# Patient Record
Sex: FEMALE | Race: WHITE | URBAN - METROPOLITAN AREA
[De-identification: names, ages, dates, MRNs, and addresses within clinical notes are randomized per-mention and may not be internally consistent; named-entity substitution may affect disease eponyms.]

---

## 2019-06-03 ENCOUNTER — APPOINTMENT (RX ONLY)
Dept: URBAN - METROPOLITAN AREA CLINIC 34 | Facility: CLINIC | Age: 69
Setting detail: DERMATOLOGY
End: 2019-06-03

## 2019-06-03 VITALS
HEIGHT: 62 IN | DIASTOLIC BLOOD PRESSURE: 70 MMHG | WEIGHT: 125 LBS | SYSTOLIC BLOOD PRESSURE: 123 MMHG | HEART RATE: 90 BPM

## 2019-06-03 DIAGNOSIS — L72.0 EPIDERMAL CYST: ICD-10-CM

## 2019-06-03 DIAGNOSIS — L82.1 OTHER SEBORRHEIC KERATOSIS: ICD-10-CM

## 2019-06-03 DIAGNOSIS — L57.0 ACTINIC KERATOSIS: ICD-10-CM

## 2019-06-03 DIAGNOSIS — R03.0 ELEVATED BLOOD-PRESSURE READING, WITHOUT DIAGNOSIS OF HYPERTENSION: ICD-10-CM

## 2019-06-03 DIAGNOSIS — L57.8 OTHER SKIN CHANGES DUE TO CHRONIC EXPOSURE TO NONIONIZING RADIATION: ICD-10-CM

## 2019-06-03 PROBLEM — E03.9 HYPOTHYROIDISM, UNSPECIFIED: Status: ACTIVE | Noted: 2019-06-03

## 2019-06-03 PROBLEM — M12.9 ARTHROPATHY, UNSPECIFIED: Status: ACTIVE | Noted: 2019-06-03

## 2019-06-03 PROBLEM — L85.3 XEROSIS CUTIS: Status: ACTIVE | Noted: 2019-06-03

## 2019-06-03 PROBLEM — Z85.3 PERSONAL HISTORY OF MALIGNANT NEOPLASM OF BREAST: Status: ACTIVE | Noted: 2019-06-03

## 2019-06-03 PROBLEM — Z92.3 PERSONAL HISTORY OF IRRADIATION: Status: ACTIVE | Noted: 2019-06-03

## 2019-06-03 PROCEDURE — 99202 OFFICE O/P NEW SF 15 MIN: CPT

## 2019-06-03 PROCEDURE — ? COUNSELING

## 2019-06-03 PROCEDURE — ? REFERRAL

## 2019-06-03 PROCEDURE — ? SUNSCREEN RECOMMENDATIONS

## 2019-06-03 ASSESSMENT — LOCATION ZONE DERM
LOCATION ZONE: NECK
LOCATION ZONE: ARM
LOCATION ZONE: LEG
LOCATION ZONE: TRUNK
LOCATION ZONE: FACE

## 2019-06-03 ASSESSMENT — LOCATION SIMPLE DESCRIPTION DERM
LOCATION SIMPLE: RIGHT FOREARM
LOCATION SIMPLE: RIGHT PRETIBIAL REGION
LOCATION SIMPLE: INFERIOR FOREHEAD
LOCATION SIMPLE: LEFT FOREARM
LOCATION SIMPLE: RIGHT TEMPLE
LOCATION SIMPLE: LEFT PRETIBIAL REGION
LOCATION SIMPLE: POSTERIOR NECK
LOCATION SIMPLE: LEFT CHEEK
LOCATION SIMPLE: LEFT THIGH
LOCATION SIMPLE: CHEST
LOCATION SIMPLE: RIGHT FOREHEAD

## 2019-06-03 ASSESSMENT — LOCATION DETAILED DESCRIPTION DERM
LOCATION DETAILED: LEFT PROXIMAL DORSAL FOREARM
LOCATION DETAILED: MID POSTERIOR NECK
LOCATION DETAILED: LEFT ANTERIOR DISTAL THIGH
LOCATION DETAILED: INFERIOR MID FOREHEAD
LOCATION DETAILED: LEFT PROXIMAL PRETIBIAL REGION
LOCATION DETAILED: STERNAL NOTCH
LOCATION DETAILED: RIGHT MID TEMPLE
LOCATION DETAILED: RIGHT PROXIMAL PRETIBIAL REGION
LOCATION DETAILED: LEFT CENTRAL MALAR CHEEK
LOCATION DETAILED: RIGHT MEDIAL FOREHEAD
LOCATION DETAILED: RIGHT PROXIMAL DORSAL FOREARM

## 2019-06-03 ASSESSMENT — PAIN INTENSITY VAS: HOW INTENSE IS YOUR PAIN 0 BEING NO PAIN, 10 BEING THE MOST SEVERE PAIN POSSIBLE?: 2/10 PAIN

## 2019-06-03 NOTE — PROCEDURE: COUNSELING
Detail Level: Detailed
Quality 317: Preventative Care And Screening: Screening For High Blood Pressure And Follow-Up Documented: Pre-hypertensive or hypertensive blood pressure reading documented, and the indicated follow-up is documented
Detail Level: Simple
Patient Specific Counseling (Will Not Stick From Patient To Patient): She has had PDT in the past- and would like to do that again as these are on her face and considerable sun exposure history. She would like to wait until fall and believe that is acceptable.  If she wishes to do earlier that is fine. She will be needing a full skin survey in February her last one in [facility] was February of 2019.\\n\\nHANDOUTS GIVEN\\nWhat is Photodynamic Therapy?\\nPhotodynamic Therapy (PDT) is a skin treatment that destroys pre-cancerous spots called actinic keratoses.  PDT is performed in our office and is done by applying a liquid called Levulan to the sun damaged areas of your skin containing actinic keratoses.  Levulan, also known as 5-aminolevulinic acid or ALA, when applied to the skin accumulates inside the precancerous cells and causes them to become sensitive to blue light.  The Levulan treated skin is then exposed to blue light resulting in destruction of the actinic keratoses. \\n\\nHow is Photodynamic Therapy Performed?\\nPhotodynamic therapy is performed in our office by our nursing staff in the following manner. The skin area to be treated is first washed with soap and water to remove surface debris and this is followed by an acetone wipe to remove residual oils from the skin. Levulan, a clear alcohol based liquid, is applied to the sun damaged areas of your skin and left on for between 1  4 hours depending on the site to be treated. Levulan is selectively taken up by precancerous cells which then become very sensitive to blue light.  The final step in the treatment process involves sitting in front of a blue light source called a EBONY U, which is a U-shaped array of light bulbs which produce a blue light. Exposure to blue light destroys the precancerous cells treated with Levulan and leaves normal skin undamaged.  This step takes approximately 17 minutes. \\n\\nWhat are the Disadvantages?\\nFor 48 hours (2 days) following photodynamic therapy you may not go out into the direct sunlight or sit where sunlight passing through a glass window or door may indirectly burn your skin (e.g. driving or being a passenger in a car; sitting or standing next to an open or closed door or window; sitting in the shade). Immediately following PDT the skin feels sunburned. The amount of discomfort is generally proportional to the number of actinic keratoses present in the treated area. Cool compresses or ice can be applied to the treated areas to relieve the discomfort. The treated areas can appear red and slightly swollen and there may be some puffiness around sensitive areas such as the lips and eyes when the face is treated. Crusting and weeping of the treated actinic keratoses will occur for the first few days and is followed by peeling off of these lesions during the following 3-7 days. Some darker patches called \"liver spots\", while generally not affected by the treatment which targets actinic keratoses, can become temporarily darker and then peel off leaving normal appearing skin.  The healing time usually takes 7 days or less  for the face and longer on non-facial areas such as the scalp, arms, chest and legs. Repeat treatments may be necessary depending on the severity of your sun damage.\\n\\nHow Much Improvement Can I Expect?\\nPatients with significant sun damaged skin experience dramatic reductions in the number of precancerous lesions.  Depending on the severity of the sun damage repeat treatments may be necessary.\\n\\nWhat are the Advantages?\\nPDT is an Optimal Treatment for Actinic Keratoses Because:\\n It is well tolerated and involves minimal downtime from work. You will be red like a sunburn and then peel. Time off from work is not necessary unless your appearance is bothersome. It may take a week to return to normal color and texture of the skin, usually 1 week. \\nIt has fewer and less severe sideffects when compared to weeks of chronic  irritation, crusting, weeping, burning and pain associated with therapies such as \\n5-Flurouracil creams (Carac® or Effudex®) or imiquimod (Aldara®)  \\nIt is non-scarring and non-invasive.  No needles, local or general anesthesis or surgery are required. Photodynamic therapy generally results in an improvement in the cosmetic appearance of the skin. \\n                \\nQuestions? \\nFeel free to call our office, [phone], at any time and ask the  if you could speak to one of our nursing staff trained in performing PDT.   Let our nursing staff help answer your questions.  \\n\\n\\n\\n\\n\\nPHOTODYNAMIC LIGHT THERAPY\\n\\nTreatment with Ameluz- typically one visit only - whether scalp, arms or face.  Medication is incubated on the skin after application for at least 3 hours before activation by light. \\n\\nBRING TO TREATMENT - FOR THE FIRST 48 HOURS AFTER TREATMENT YOU HAVE TO COVER UP WHEN LEAVING THE CLINIC, RIDING IN THE CAR, SITTING NEXT TO BRIGHT WINDOWS AND GOING IN AND OUT OF BUILDLINGS AND ANY OUTDOOR TIME\\n1.  If treating scalp - bring wide brimmed hat \\n2.  If treating face  - bring wide brimmed hat and a scarf\\n3.  If treating the arms - bring long sleeves- and gloves that will cover the backs of the hands (fingers can be cut out or gloves with finger tips\\n\\nYou may also wish to bring something to do during the incubation period  Sometimes arrangements can be made for you to go home for incubation time if local- but coverage is vital or you will overexpose . \\n\\nIndividuals responses vary greatly.  One doesn't have to have a big exaggerated response to have benefit.  Some people do have a big response which will lead to a usually a good outcome just allow time - this is a treatment.\\n\\nPatient Home Care Instructions\\n·  Stay out of direct sunlight for 48 hours.	\\n·  Keep the treated area clean to minimize risk for infection\\n·  Apply moisturizer as often as needed such as Aquaphor. \\n\\nTYPICALLY NOT NEEDED:\\n·  Take Advil if necessary for pain. ONLY IF TOLERATED\\n·  Spray on bottled water to cool the skin\\n·  You may begin applying makeup as soon as you feel comfortable and once any crusting has peeled .\\n\\nWhat can I expect post-treatment?\\n    A sun-burn like effect is normal and can last for up to 5-7 days.  This can mean anything from being light pink to a swollen, red, and itchy sunburn. Peeling may begin on Day 3 and 4, make sure you do not pick! Crusting may occur where there was sun damage and/or Actinic Keratoses.   PROTECT from the sun.\\n\\nALWAYS CALL IF ANY CONCERNS!\\n

## 2019-06-03 NOTE — PROCEDURE: MIPS QUALITY
Quality 474: Zoster Vaccination Status: Shingrix Vaccination Administered or Previously Received
Detail Level: Detailed
Additional Notes: Recommend that the patient, if they have not, consider discussing with their primary care provider or local pharmacist the shingles/shingrix vaccine.